# Patient Record
Sex: FEMALE | Race: WHITE | NOT HISPANIC OR LATINO | Employment: UNEMPLOYED | URBAN - METROPOLITAN AREA
[De-identification: names, ages, dates, MRNs, and addresses within clinical notes are randomized per-mention and may not be internally consistent; named-entity substitution may affect disease eponyms.]

---

## 2021-01-01 ENCOUNTER — TRANSFERRED RECORDS (OUTPATIENT)
Dept: HEALTH INFORMATION MANAGEMENT | Facility: CLINIC | Age: 56
End: 2021-01-01
Payer: COMMERCIAL

## 2021-01-01 ENCOUNTER — VIRTUAL VISIT (OUTPATIENT)
Dept: NEUROSURGERY | Facility: CLINIC | Age: 56
End: 2021-01-01
Attending: NURSE PRACTITIONER
Payer: COMMERCIAL

## 2021-01-01 ENCOUNTER — PRE VISIT (OUTPATIENT)
Dept: NEUROSURGERY | Facility: CLINIC | Age: 56
End: 2021-01-01

## 2021-01-01 ENCOUNTER — TRANSCRIBE ORDERS (OUTPATIENT)
Dept: OTHER | Age: 56
End: 2021-01-01

## 2021-01-01 ENCOUNTER — MEDICAL CORRESPONDENCE (OUTPATIENT)
Dept: HEALTH INFORMATION MANAGEMENT | Facility: CLINIC | Age: 56
End: 2021-01-01
Payer: COMMERCIAL

## 2021-01-01 ENCOUNTER — TRANSFERRED RECORDS (OUTPATIENT)
Dept: HEALTH INFORMATION MANAGEMENT | Facility: CLINIC | Age: 56
End: 2021-01-01

## 2021-01-01 DIAGNOSIS — C71.9 GLIOBLASTOMA (H): Primary | ICD-10-CM

## 2021-01-01 DIAGNOSIS — C71.2 GLIOBLASTOMA OF TEMPORAL LOBE (H): Primary | ICD-10-CM

## 2021-01-01 PROCEDURE — 999N001193 HC VIDEO/TELEPHONE VISIT; NO CHARGE

## 2021-01-01 PROCEDURE — 99203 OFFICE O/P NEW LOW 30 MIN: CPT | Mod: 95 | Performed by: NEUROLOGICAL SURGERY

## 2021-01-01 RX ORDER — DRONABINOL 2.5 MG/1
CAPSULE ORAL
COMMUNITY
Start: 2021-06-15

## 2021-01-01 RX ORDER — FAMOTIDINE 40 MG/1
20 TABLET, FILM COATED ORAL
COMMUNITY
Start: 2021-06-14

## 2021-01-01 RX ORDER — ACETAMINOPHEN 325 MG/1
325-650 TABLET ORAL
COMMUNITY
Start: 2021-03-05

## 2021-01-01 RX ORDER — FERROUS SULFATE 325(65) MG
325 TABLET ORAL
COMMUNITY

## 2021-01-01 RX ORDER — FOLIC ACID 1 MG/1
1 TABLET ORAL
COMMUNITY

## 2021-01-01 RX ORDER — BUPROPION HYDROCHLORIDE 100 MG/1
100 TABLET, EXTENDED RELEASE ORAL
COMMUNITY
Start: 2020-12-08

## 2021-02-11 ENCOUNTER — TRANSFERRED RECORDS (OUTPATIENT)
Dept: RADIATION ONCOLOGY | Facility: CLINIC | Age: 56
End: 2021-02-11

## 2021-10-27 NOTE — TELEPHONE ENCOUNTER
FUTURE VISIT INFORMATION      FUTURE VISIT INFORMATION:    Date: 10/29/2021    Time: 2pm    [place] [facility] [place]  REFERRAL INFORMATION:    Referring provider:      Referring providers clinic:  [facility]    Reason for visit/diagnosis  Glioblastoma    RECORDS REQUESTED FROM:       Clinic name Comments Records Status Imaging Status   [facility] Head-2/9/2021, 2/2/2021, 11/3/2020    [facility] Head/Neck-2/2/2021, 11/3/2020    MR Brain-2/8/2021, 2/3/2021 Care Everywhere Requested to PACS         [facility]  Requested  Requested                        10/27/2021-Request for images faxed to [facility] and request for records faxed to [facility]-MR @ 1130am    10/28/2021-[facility] Images now in PACS-MR @ 558am    2nd request for records faxed to [facility] @ 722am

## 2021-10-29 NOTE — PROGRESS NOTES
Holli is a 56 year old who is being evaluated via a billable telephone visit.      What phone number would you like to be contacted at? [phone]  Patient would also like to have  be called and join telephone visit at phone number [phone].  How would you like to obtain your AVS? Mail a copy  Phone call duration: 25 minutes    56 F s/p glioblastoma resection on 2/8/2021 and subsequently undergone TMZ/RT. The brain MRIs subsequent to the resection were not available to me, but the patient reports no evidence of recurrence. The patient would like to establish contact should future MRI reveal recurrence. She presents for this purpose.    I had reviewed the available records and discussed the therapeutic options should the tumor recur, including EMMA, gammatiles, clinical trials, and EAP. The patient will contact me again should future MRI showed tumor recurrence.

## 2021-10-29 NOTE — LETTER
10/29/2021         RE: Holli Pires  [address]  [place] [address]        Dear Colleague,    Thank you for referring your patient, Holli Pires, to the [facility]. Please see a copy of my visit note below.    Holli is a 56 year old who is being evaluated via a billable telephone visit.      What phone number would you like to be contacted at? [phone]  Patient would also like to have  be called and join telephone visit at phone number [phone].  How would you like to obtain your AVS? Mail a copy  Phone call duration: 25 minutes    56 F s/p glioblastoma resection on 2/8/2021 and subsequently undergone TMZ/RT. The brain MRIs subsequent to the resection were not available to me, but the patient reports no evidence of recurrence. The patient would like to establish contact should future MRI reveal recurrence. She presents for this purpose.    I had reviewed the available records and discussed the therapeutic options should the tumor recur, including EMMA, gammatiles, clinical trials, and EAP. The patient will contact me again should future MRI showed tumor recurrence.        Again, thank you for allowing me to participate in the care of your patient.      Sincerely,    Moshe Lee MD

## 2022-01-01 ENCOUNTER — ONCOLOGY VISIT (OUTPATIENT)
Dept: ONCOLOGY | Facility: CLINIC | Age: 57
End: 2022-01-01
Attending: PSYCHIATRY & NEUROLOGY
Payer: COMMERCIAL

## 2022-01-01 ENCOUNTER — PATIENT OUTREACH (OUTPATIENT)
Dept: ONCOLOGY | Facility: CLINIC | Age: 57
End: 2022-01-01

## 2022-01-01 ENCOUNTER — TRANSFERRED RECORDS (OUTPATIENT)
Dept: HEALTH INFORMATION MANAGEMENT | Facility: CLINIC | Age: 57
End: 2022-01-01

## 2022-01-01 ENCOUNTER — PRE VISIT (OUTPATIENT)
Dept: ONCOLOGY | Facility: CLINIC | Age: 57
End: 2022-01-01

## 2022-01-01 ENCOUNTER — LAB REQUISITION (OUTPATIENT)
Dept: LAB | Facility: CLINIC | Age: 57
End: 2022-01-01
Payer: COMMERCIAL

## 2022-01-01 ENCOUNTER — OFFICE VISIT (OUTPATIENT)
Dept: RADIATION ONCOLOGY | Facility: CLINIC | Age: 57
End: 2022-01-01
Attending: RADIOLOGY
Payer: COMMERCIAL

## 2022-01-01 ENCOUNTER — TRANSCRIBE ORDERS (OUTPATIENT)
Dept: OTHER | Age: 57
End: 2022-01-01

## 2022-01-01 ENCOUNTER — MEDICAL CORRESPONDENCE (OUTPATIENT)
Dept: HEALTH INFORMATION MANAGEMENT | Facility: CLINIC | Age: 57
End: 2022-01-01

## 2022-01-01 VITALS
WEIGHT: 181 LBS | BODY MASS INDEX: 32.07 KG/M2 | OXYGEN SATURATION: 98 % | DIASTOLIC BLOOD PRESSURE: 80 MMHG | HEART RATE: 54 BPM | SYSTOLIC BLOOD PRESSURE: 129 MMHG | TEMPERATURE: 97.7 F | HEIGHT: 63 IN

## 2022-01-01 VITALS
OXYGEN SATURATION: 100 % | HEART RATE: 62 BPM | SYSTOLIC BLOOD PRESSURE: 118 MMHG | BODY MASS INDEX: 31.54 KG/M2 | WEIGHT: 175.8 LBS | RESPIRATION RATE: 16 BRPM | DIASTOLIC BLOOD PRESSURE: 76 MMHG

## 2022-01-01 DIAGNOSIS — C71.9 GLIOBLASTOMA (H): Primary | ICD-10-CM

## 2022-01-01 DIAGNOSIS — C71.9 GLIOBLASTOMA (H): ICD-10-CM

## 2022-01-01 LAB
PATH REPORT.COMMENTS IMP SPEC: ABNORMAL
PATH REPORT.COMMENTS IMP SPEC: YES
PATH REPORT.FINAL DX SPEC: ABNORMAL
PATH REPORT.GROSS SPEC: ABNORMAL
PATH REPORT.RELEVANT HX SPEC: ABNORMAL
PATH REPORT.RELEVANT HX SPEC: ABNORMAL
PATH REPORT.SITE OF ORIGIN SPEC: ABNORMAL
PHQ9 SCORE: 15

## 2022-01-01 PROCEDURE — 88321 CONSLTJ&REPRT SLD PREP ELSWR: CPT | Performed by: SPECIALIST

## 2022-01-01 PROCEDURE — G0463 HOSPITAL OUTPT CLINIC VISIT: HCPCS

## 2022-01-01 PROCEDURE — 99205 OFFICE O/P NEW HI 60 MIN: CPT | Performed by: PSYCHIATRY & NEUROLOGY

## 2022-01-01 RX ORDER — NITROGLYCERIN 0.4 MG/1
0.4 TABLET SUBLINGUAL
COMMUNITY
Start: 2020-11-05

## 2022-01-01 RX ORDER — METOPROLOL SUCCINATE 25 MG/1
0.5 TABLET, EXTENDED RELEASE ORAL DAILY
COMMUNITY
Start: 2022-01-01

## 2022-01-01 RX ORDER — MULTIVITAMIN,THER AND MINERALS
1 TABLET ORAL DAILY
COMMUNITY

## 2022-01-01 RX ORDER — ALBUTEROL SULFATE 90 UG/1
1-2 AEROSOL, METERED RESPIRATORY (INHALATION)
Status: CANCELLED
Start: 2022-01-01

## 2022-01-01 RX ORDER — FAMOTIDINE 40 MG/1
20 TABLET, FILM COATED ORAL
COMMUNITY
Start: 2021-01-01

## 2022-01-01 RX ORDER — METHYLPREDNISOLONE SODIUM SUCCINATE 125 MG/2ML
125 INJECTION, POWDER, LYOPHILIZED, FOR SOLUTION INTRAMUSCULAR; INTRAVENOUS
Status: CANCELLED
Start: 2022-01-01

## 2022-01-01 RX ORDER — ONDANSETRON 4 MG/1
4 TABLET, ORALLY DISINTEGRATING ORAL
COMMUNITY
Start: 2022-01-01

## 2022-01-01 RX ORDER — LISINOPRIL 10 MG/1
TABLET ORAL
COMMUNITY
Start: 2021-01-01

## 2022-01-01 RX ORDER — EPINEPHRINE 1 MG/ML
0.3 INJECTION, SOLUTION INTRAMUSCULAR; SUBCUTANEOUS EVERY 5 MIN PRN
Status: CANCELLED | OUTPATIENT
Start: 2022-01-01

## 2022-01-01 RX ORDER — LORAZEPAM 2 MG/ML
0.5 INJECTION INTRAMUSCULAR EVERY 4 HOURS PRN
Status: CANCELLED | OUTPATIENT
Start: 2022-01-01

## 2022-01-01 RX ORDER — ROPINIROLE 0.5 MG/1
TABLET, FILM COATED ORAL
COMMUNITY
Start: 2022-01-01

## 2022-01-01 RX ORDER — LEVETIRACETAM 1000 MG/1
1 TABLET ORAL 2 TIMES DAILY
COMMUNITY
Start: 2022-01-01

## 2022-01-01 RX ORDER — HEPARIN SODIUM (PORCINE) LOCK FLUSH IV SOLN 100 UNIT/ML 100 UNIT/ML
5 SOLUTION INTRAVENOUS
Status: CANCELLED | OUTPATIENT
Start: 2022-01-01

## 2022-01-01 RX ORDER — HEPARIN SODIUM,PORCINE 10 UNIT/ML
5 VIAL (ML) INTRAVENOUS
Status: CANCELLED | OUTPATIENT
Start: 2022-01-01

## 2022-01-01 RX ORDER — MEPERIDINE HYDROCHLORIDE 25 MG/ML
25 INJECTION INTRAMUSCULAR; INTRAVENOUS; SUBCUTANEOUS EVERY 30 MIN PRN
Status: CANCELLED | OUTPATIENT
Start: 2022-01-01

## 2022-01-01 RX ORDER — DIPHENHYDRAMINE HYDROCHLORIDE 50 MG/ML
50 INJECTION INTRAMUSCULAR; INTRAVENOUS
Status: CANCELLED
Start: 2022-01-01

## 2022-01-01 RX ORDER — AMOXICILLIN 250 MG
1 CAPSULE ORAL DAILY
COMMUNITY
Start: 2021-08-02

## 2022-01-01 RX ORDER — OXYCODONE AND ACETAMINOPHEN 5; 325 MG/1; MG/1
1-2 TABLET ORAL
COMMUNITY
Start: 2021-02-11

## 2022-01-01 RX ORDER — ALBUTEROL SULFATE 0.83 MG/ML
2.5 SOLUTION RESPIRATORY (INHALATION)
Status: CANCELLED | OUTPATIENT
Start: 2022-01-01

## 2022-01-01 ASSESSMENT — ENCOUNTER SYMPTOMS
NAUSEA: 1
MYALGIAS: 1
CONSTITUTIONAL NEGATIVE: 1
HEADACHES: 1
CARDIOVASCULAR NEGATIVE: 1
RESPIRATORY NEGATIVE: 1
EYES NEGATIVE: 1
SPEECH CHANGE: 1

## 2022-01-01 ASSESSMENT — PAIN SCALES - GENERAL
PAINLEVEL: EXTREME PAIN (8)
PAINLEVEL: SEVERE PAIN (7)

## 2022-07-27 NOTE — PROGRESS NOTES
I received a referral for patient to be seen by medical oncology.  I called the patient and left a vm asking that they return my call.  Contact information was left.    New Patient Oncology Nurse Navigator Note     Referring provider: Referral for  Glioblastoma. Referred by Dr. Diana Felix MD at [facility] [place] [address] [address] [facility] [address]. Ph. [phone] Fax. [phone] Referral from fax.        Referred to (specialty): Neuro Medical Oncology    Requested provider (if applicable): Dr. Rehana Quinn     Date Referral Received: 7/27/2022     Evaluation for : brain tumor--GBM     Clinical History (per Nurse review of records provided):    **BOOK MARKED**  NOTES:  7/25/2022:  Last note from [facility]--media tab  2/8/2021:  Surgical note:  Craniotomy @ [facility]    IMAGING:  ~multiple images from [facility]~    PATHOLOGY:  2/8/2021  Amendment  2/17/2021 - The tissue was submitted to [facility] for MGMT promoter methylation testing. Their report indicates that gene methylation was not detected.  Please see attached scanned report.  [facility]   Final Diagnosis  A) LEFT FRONTAL DURA, EXCISION:   1. Dura mater with reactive changes   2. No invasion by neoplasm     B,C) BRAIN, LEFT FRONTOTEMPORAL LOBE TUMOR, RESECTION:   1. Glioblastoma, WHO grade 4; see comment   2. IDH1 R132H immunostain negative   3. MGMT promoter methylation ordered, to be reported by addendum [facility]   Amendment electronically signed by Savage Lennon MD on 2/17/2021 at  9:41 AM Electronically signed by Savage Lennon MD on 2/9/2021 at  3:21 PM    Comment  O(6)-methylguanine-DNA methyltransferase (MGMT) promoter methylation testing is recommended for glioblastoma to help predict response to alkylating agent therapy (1-2). It is forwarded by [facility] as a reflex test and is performed by [facility]. No additional order needs to be placed at this time.     Material from block C5 is allocated for ancillary testing purposes. Results of this testing will be published by addendum.             Clinical Assessment / Barriers to Care (Per Nurse): none noted       Records Location (Care Everywhere, Media, etc.): [facility]     Records Needed: [facility], imaging from?     Additional testing needed prior to consult: none

## 2022-07-27 NOTE — PROGRESS NOTES
"I left a second vm on Holli's phone and looked at the [facility] records and left a generic message on this phone listed as \"other\":  [phone]    Reocrds have been collected and waiting to reach Holli to hopefully schedule [place] [facility] [place]  "

## 2022-07-27 NOTE — TELEPHONE ENCOUNTER
Action 2022 2:41 PM ABT   Action Taken Slides 21: [id number] from [facility] received and taken to 5th floor path lab for review.     RECORDS STATUS - ALL OTHER DIAGNOSIS      Action    Action Taken 22  Imaging reports from [facility] received, sent to HIM for upload    Radiation records from [facility] received, sent to HIM for upload.    Imaging from [facility] resolved to PACS  2:10 PM    Imaging from [facility] resolved to PACS  3:09 PM       RECORDS RECEIVED FROM: Bryant Muir, [facility]   DATE RECEIVED: 22   NOTES STATUS DETAILS   OFFICE NOTE from referring provider [facility] - Received  Dr. Diana Felix   OFFICE NOTE from medical oncologist [facility] 22   DISCHARGE SUMMARY from hospital CE - [facility] 20, 21, 21, 20   DISCHARGE REPORT from the ER CE - [facility] 22, 22   OPERATIVE REPORT CE - [facility] 21: Craniotomy   MEDICATION LIST CE [facility]   LABS     PATHOLOGY REPORTS [facility], Report in CE, slides requested   [employer] [facility] 21: [id number]   ANYTHING RELATED TO DIAGNOSIS Epic/CE 22   GENONOMIC TESTING     TYPE: [facility] - CE 21: MGMT   IMAGING (NEED IMAGES & REPORT)     CT SCANS PACS 22, 22, 11/3/20: Requested/[facility]    21, 21: PACS/[facility]   MRI PACS 21, 2/3/21: PACS    [facility]

## 2022-07-29 PROBLEM — C71.9 GLIOBLASTOMA (H): Status: ACTIVE | Noted: 2022-01-01

## 2022-07-29 NOTE — LETTER
7/29/2022         RE: Holli Pires  [address] [place] [address]        Dear Colleague,    Thank you for referring your patient, Holli Pires, to the [facility]. Please see a copy of my visit note below.    NEURO-ONCOLOGY INITIAL VISIT  Jul 29, 2022    CHIEF COMPLAINT: Ms. 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 She then completed standard chemoradiotherapy with concurrent temozolomide followed by 6 cycles of adjuvant temozolomide, completed in 10/2021. Imaging in 1/2022 showed changes that raised concern for recurrence and she was restart on adjuvant-dosed temozolomide. Imaging in 3/2022 prompted the stop of temozolomide and the start of bevacizumab (sharri) with imaging in 5/2022 demonstrating an anticipated positive treatment response to sharri. However, imaging in 7/2022 demonstrated further cancer progression. She remains on sharri monotherapy.     She is presenting to this initial clinic visit for evaluation and recommendations on treatment. She is currently under the care of Dr. Diana Felix.     Accompanying her to this visit is Benoit ().    HISTORY OF PRESENT ILLNESS  A summary of the patient s oncologic history is as follows;   -PRESENTATION: New onset seizure; generalized event.    -2/2021 MR brain imaging with a left frontal-temporal lesion.   -2/8/2021 SURGERY: Resection, gross total.   PATHOLOGY: Glioblastoma; IDH1 R132H wildtype, MGMT promoter unmethylated.  -3/8 - 4/26/2021 CHEMORADS: Radiation therapy with concurrent temozolomide.   -May - October 2021 CHEMO: Adjuvant temozolomide, completed 6 cycles.    -1/2022 MR brain imaging with evidence of cancer progression.   -Jan - Mar 2022 CHEMO: Restarted adjuvant-dosed temozolomide.    -3/2022 MR brain imaging demonstrated evidence of futher progression.    -4/7/2022 CHEMO: Started bevacizumab (sharri).  -5/31/2022 MR brain imaging with anticipated treatment response to sharri.    -7/12/2022 MR brain imaging with cancer progression.    -7/29/2022 NEURO-ONC: Consideration for repeat radiation with pembrolizumab.     Today in clinic;   -Holli is doing alright today.   -Generalized intermittent numbness. Due to RA, has much joint pain and associated generalized weakness.   -Blurring of vision in right > left eye, intermittent in nature.  -Experiences occasional headaches, 1-2 x per week at 3/10 in severity. Uses oxycodone as needed.   -Chronic fatigue. Did not tolerate Ritalin.   -She notes slowed cognition and word-finding difficulty.   -Seizure on presentation was a generalized event. No recurrent events. On Keppra.   -Mood is lower, has chronic depression.   -Off all steroids.  -Low appetite; not taking Marinol.       MEDICATIONS   Current Outpatient Medications   Medication Sig Dispense Refill     famotidine (PEPCID) 40 MG tablet Take 20 mg by mouth       levETIRAcetam (KEPPRA) 1000 MG tablet Take 1 tablet by mouth 2 times daily       lisinopril (ZESTRIL) 10 MG tablet TAKE 1 TABLET(10 MG) BY MOUTH EVERY DAY       metoprolol succinate ER (TOPROL XL) 25 MG 24 hr tablet Take 0.5 tablets by mouth daily       nitroGLYcerin (NITROSTAT) 0.4 MG sublingual tablet Place 0.4 mg under the tongue       ondansetron (ZOFRAN ODT) 4 MG ODT tab Place 4 mg under the tongue       oxyCODONE-acetaminophen (PERCOCET) 5-325 MG tablet Take 1-2 tablets by mouth       rOPINIRole (REQUIP) 0.5 MG tablet        senna-docusate (SENOKOT-S/PERICOLACE) 8.6-50 MG tablet Take 1 tablet by mouth daily       acetaminophen (TYLENOL) 325 MG tablet Take 325-650 mg by mouth       buPROPion (WELLBUTRIN SR) 100 MG 12 hr tablet Take 100 mg by mouth       cholecalciferol 50 MCG (2000 UT) tablet Take 2,000 Units by mouth       dronabinol (MARINOL) 2.5 MG capsule        famotidine (PEPCID) 40 MG tablet Take 20 mg by  "mouth       ferrous sulfate (FEROSUL) 325 (65 Fe) MG tablet Take 325 mg by mouth       FLUoxetine (PROZAC) 20 MG capsule        folic acid (FOLVITE) 1 MG tablet Take 1 mg by mouth       Multiple Vitamins-Minerals (SUPER THERA GABINO M) TABS Take 1 tablet by mouth daily       DRUG ALLERGIES   Allergies   Allergen Reactions     Erythromycin Nausea and Vomiting and Rash     Sulfa Drugs Rash       IMMUNIZATIONS   Immunization History   Administered Date(s) Administered     COVID-19,PF,Pfizer 12+ Yrs (2022 and After) 01/20/2022       PHYSICAL EXAMINATION  /80 (BP Location: Right arm, Patient Position: Sitting, Cuff Size: Adult Regular)   Pulse 54   Temp 97.7  F (36.5  C) (Oral)   Ht 1.59 m (5' 2.6\")   Wt 82.1 kg (181 lb)   SpO2 98%   BMI 32.48 kg/m    Wt Readings from Last 2 Encounters:   07/29/22 82.1 kg (181 lb)      Ht Readings from Last 2 Encounters:   07/29/22 1.59 m (5' 2.6\")     KPS: 80    -Generally well appearing.  -Respiratory: Normal breath sounds, no audible wheezing.   -Psychiatric: Normal mood and affect. Pleasant, talkative.  -Neurologic:   MENTAL STATUS:     Alert, oriented to date.    Recall: Intact.    Speech fluent.    Comprehension intact to multi-step commands.   Good right-left orientation.     CRANIAL NERVES:     Pupils are equal, round.     Extraocular movements full, denies diplopia.     Visual fields full.     Facial sensation intact to light touch.   Symmetric facial movements.   Hearing intact.   No dysarthria.   MOTOR: No pronation or drift.  SENSATION: Intact to light touch throughout.  COORDINATION: Intact finger-nose with eyes open and closed.    GAIT:  Walks without assistance.   Slow speed. Antalgic.       MEDICAL RECORDS  Obtained and personally reviewed all available outside medical records in addition to reviewing any records available in our electronic system.   -Outside hospitalization and oncology clinic notes.     LABS  Personally reviewed all available lab results; CBC, " CMP. Pathology.     IMAGING  Personally reviewed prior MR brain imaging; Imaging in 5/2022 demonstrating an anticipated positive treatment response to sahrri. However, imaging in 7/2022 demonstrated further cancer progression.    Imaging was shown to and results were reviewed with Holli and Benoit.       IMPRESSION  Clinic time for this high complexity encounter was spent discussing in detail the nature of her cancer, answering questions pertaining to my recommendations, and devising the treatment plan as outlined below. Both Holli and Benoit are acutely aware that she has a type of brain cancer for which there is currently no cure. Therefore, cancer-directed treatment strives to slow further growth and increase the time interval to recurrence.    Prior to Holli's appointment today, I reviewed her chart (including the clinic notes from Dr. Felix), pathology results, and imaging. Today, I confirmed her understand of the clinical and prognostic significance of her cancer's MGMT promoter methylation status. We also reviewed her imaging that detailed since January there has been changes concerning for cancer recurrence. Chemotherapy changes have been instituted to address this radiographic progression of her cancer, including a repeat trial of temozolomide and most recently, the use of sharri. In addition, I have also discussed her case with Dr. Chao in neurosurgery and Beatriz Wilkes and Hiral in radiation oncology. In the setting of radiographic evidence of cancer recurrence, a multifaceted treatment approach needs to be considered. In discussion with Dr. Chao, resection of the left frontal contrast enhancement would be associated with much risk. In discussion with Beatriz Wilkes and Hiral, they will request her prior treatment plans to determine the feasibility of repeat radiation. I will look to arrange for Holli to see Dr. Blackman/ Chung in consultation.     Pending their discussion, a treatment plan will be devised and then, the  most appropriate chemotherapy can be added to the regimen. One option we discussed today was the off-labeled use of immunotherapy with a check-point inhibitor, like pembrolizumab. While in clinical trials, check-point inhibitors have shown little effectiveness against glioblastoma, more heavily treated cancer, hence those that have accumulated more mutations, could have a more benefit. We discussed in detail the side effects of immunotherapy; inflammation of any organ of the body. We will be monitor labs to assess liver and thyroid function and also clinically, for any new clinical side effects (diarrhea, SOB, etc). I stressed my uncertainty if immunotherapy would worsen her RA. If side effect(s) develop, they can be largely reversed by the use of high-dosed steroids. Specific to the brain; given her extent of intracranial cancer, she could experience brain swelling for which, she could become very symptomatic with worsening confusion, headache, vomiting, weakness, seizure, etc. Once I know radiation therapy is an option, I will move forward with securing pembrolizumab.       With regard to the continued use of bevacizumab (sharri); in the setting of contrast enhancing progression seen on imaging and given Holli's overall clinical stability without the need for steroids, it is my recommendation to stop this chemotherapy as there appears to be limited to no benefit. As such, Holli is largely taking on the risk/ side effect profile of sharri (MI, stroke, bleeding, clotting, etc).     PROBLEM LIST  Glioblastoma  Seizure    PLAN  -CANCER-DIRECTED THERAPY-  -As above; referral to radiation oncology.     -STEROIDS-  -Currently off dexamethasone.    -SEIZURE MANAGEMENT-  -Given history of seizures, will continue current antiepileptic regimen of Keppra for the foreseeable future.  -On Wellbutrin that can lower one's seizure threshold.     -Quality of life/ MOOD/ FATIGUE-  -History of depression; on fluoxetine and Wellbutrin.    -Chronic fatigue. No prior benefit from Ritalin.  -Enrollment for medical marijuana; [email or web address]      Return to clinic pending discussion with radiation oncology.     In the meantime, Holli knows to call with questions or concerns or to report new complaints and can be seen sooner if needed.      Rehana Quinn MD  Neuro-oncology

## 2022-07-29 NOTE — NURSING NOTE
"Oncology Rooming Note    July 29, 2022 4:12 PM   Holli Pires is a 57 year old female who presents for:    Chief Complaint   Patient presents with     Oncology Clinic Visit     Glioblastoma      Initial Vitals: /80 (BP Location: Right arm, Patient Position: Sitting, Cuff Size: Adult Regular)   Pulse 54   Temp 97.7  F (36.5  C) (Oral)   Ht 1.59 m (5' 2.6\")   Wt 82.1 kg (181 lb)   SpO2 98%   BMI 32.48 kg/m   Estimated body mass index is 32.48 kg/m  as calculated from the following:    Height as of this encounter: 1.59 m (5' 2.6\").    Weight as of this encounter: 82.1 kg (181 lb). Body surface area is 1.9 meters squared.  Severe Pain (7) Comment: Data Unavailable   No LMP recorded. Patient is postmenopausal.  Allergies reviewed: Yes  Medications reviewed: Yes    Medications: Medication refills not needed today.  Pharmacy name entered into [facility]: [id number] [place] [phone] AT [facility]  [address]    Clinical concerns: none.      Silvestre Fofana"
PROCEDURES:  Craniotomy, using frameless stereotaxy, for neoplasm excision 21-Sep-2020 21:27:32  Jacinto Rhodes

## 2022-07-29 NOTE — PROGRESS NOTES
NEURO-ONCOLOGY INITIAL VISIT  Jul 29, 2022    CHIEF COMPLAINT: Ms. 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 She then completed standard chemoradiotherapy with concurrent temozolomide followed by 6 cycles of adjuvant temozolomide, completed in 10/2021. Imaging in 1/2022 showed changes that raised concern for recurrence and she was restart on adjuvant-dosed temozolomide. Imaging in 3/2022 prompted the stop of temozolomide and the start of bevacizumab (sharri) with imaging in 5/2022 demonstrating an anticipated positive treatment response to sharri. However, imaging in 7/2022 demonstrated further cancer progression. She remains on sharri monotherapy.     She is presenting to this initial clinic visit for evaluation and recommendations on treatment. She is currently under the care of Dr. Diana Felix.     Accompanying her to this visit is Benoit ().    HISTORY OF PRESENT ILLNESS  A summary of the patient s oncologic history is as follows;   -PRESENTATION: New onset seizure; generalized event.    -2/2021 MR brain imaging with a left frontal-temporal lesion.   -2/8/2021 SURGERY: Resection, gross total.   PATHOLOGY: Glioblastoma; IDH1 R132H wildtype, MGMT promoter unmethylated.  -3/8 - 4/26/2021 CHEMORADS: Radiation therapy with concurrent temozolomide.   -May - October 2021 CHEMO: Adjuvant temozolomide, completed 6 cycles.    -1/2022 MR brain imaging with evidence of cancer progression.   -Jan - Mar 2022 CHEMO: Restarted adjuvant-dosed temozolomide.    -3/2022 MR brain imaging demonstrated evidence of futher progression.   -4/7/2022 CHEMO: Started bevacizumab (sharri).  -5/31/2022 MR brain imaging with anticipated treatment response to sharri.    -7/12/2022 MR brain imaging with cancer progression.    -7/29/2022 NEURO-ONC: Consideration for repeat radiation with pembrolizumab.     Today in  clinic;   -Holli is doing alright today.   -Generalized intermittent numbness. Due to RA, has much joint pain and associated generalized weakness.   -Blurring of vision in right > left eye, intermittent in nature.  -Experiences occasional headaches, 1-2 x per week at 3/10 in severity. Uses oxycodone as needed.   -Chronic fatigue. Did not tolerate Ritalin.   -She notes slowed cognition and word-finding difficulty.   -Seizure on presentation was a generalized event. No recurrent events. On Keppra.   -Mood is lower, has chronic depression.   -Off all steroids.  -Low appetite; not taking Marinol.       MEDICATIONS   Current Outpatient Medications   Medication Sig Dispense Refill     famotidine (PEPCID) 40 MG tablet Take 20 mg by mouth       levETIRAcetam (KEPPRA) 1000 MG tablet Take 1 tablet by mouth 2 times daily       lisinopril (ZESTRIL) 10 MG tablet TAKE 1 TABLET(10 MG) BY MOUTH EVERY DAY       metoprolol succinate ER (TOPROL XL) 25 MG 24 hr tablet Take 0.5 tablets by mouth daily       nitroGLYcerin (NITROSTAT) 0.4 MG sublingual tablet Place 0.4 mg under the tongue       ondansetron (ZOFRAN ODT) 4 MG ODT tab Place 4 mg under the tongue       oxyCODONE-acetaminophen (PERCOCET) 5-325 MG tablet Take 1-2 tablets by mouth       rOPINIRole (REQUIP) 0.5 MG tablet        senna-docusate (SENOKOT-S/PERICOLACE) 8.6-50 MG tablet Take 1 tablet by mouth daily       acetaminophen (TYLENOL) 325 MG tablet Take 325-650 mg by mouth       buPROPion (WELLBUTRIN SR) 100 MG 12 hr tablet Take 100 mg by mouth       cholecalciferol 50 MCG (2000 UT) tablet Take 2,000 Units by mouth       dronabinol (MARINOL) 2.5 MG capsule        famotidine (PEPCID) 40 MG tablet Take 20 mg by mouth       ferrous sulfate (FEROSUL) 325 (65 Fe) MG tablet Take 325 mg by mouth       FLUoxetine (PROZAC) 20 MG capsule        folic acid (FOLVITE) 1 MG tablet Take 1 mg by mouth       Multiple Vitamins-Minerals (SUPER THERA GABINO M) TABS Take 1 tablet by mouth daily    "    DRUG ALLERGIES   Allergies   Allergen Reactions     Erythromycin Nausea and Vomiting and Rash     Sulfa Drugs Rash       IMMUNIZATIONS   Immunization History   Administered Date(s) Administered     COVID-19,PF,Pfizer 12+ Yrs (2022 and After) 01/20/2022       PHYSICAL EXAMINATION  /80 (BP Location: Right arm, Patient Position: Sitting, Cuff Size: Adult Regular)   Pulse 54   Temp 97.7  F (36.5  C) (Oral)   Ht 1.59 m (5' 2.6\")   Wt 82.1 kg (181 lb)   SpO2 98%   BMI 32.48 kg/m    Wt Readings from Last 2 Encounters:   07/29/22 82.1 kg (181 lb)      Ht Readings from Last 2 Encounters:   07/29/22 1.59 m (5' 2.6\")     KPS: 80    -Generally well appearing.  -Respiratory: Normal breath sounds, no audible wheezing.   -Psychiatric: Normal mood and affect. Pleasant, talkative.  -Neurologic:   MENTAL STATUS:     Alert, oriented to date.    Recall: Intact.    Speech fluent.    Comprehension intact to multi-step commands.   Good right-left orientation.     CRANIAL NERVES:     Pupils are equal, round.     Extraocular movements full, denies diplopia.     Visual fields full.     Facial sensation intact to light touch.   Symmetric facial movements.   Hearing intact.   No dysarthria.   MOTOR: No pronation or drift.  SENSATION: Intact to light touch throughout.  COORDINATION: Intact finger-nose with eyes open and closed.    GAIT:  Walks without assistance.   Slow speed. Antalgic.       MEDICAL RECORDS  Obtained and personally reviewed all available outside medical records in addition to reviewing any records available in our electronic system.   -Outside hospitalization and oncology clinic notes.     LABS  Personally reviewed all available lab results; CBC, CMP. Pathology.     IMAGING  Personally reviewed prior MR brain imaging; Imaging in 5/2022 demonstrating an anticipated positive treatment response to sharri. However, imaging in 7/2022 demonstrated further cancer progression.    Imaging was shown to and results were " reviewed with Holli and Benoit.       IMPRESSION  Clinic time for this high complexity encounter was spent discussing in detail the nature of her cancer, answering questions pertaining to my recommendations, and devising the treatment plan as outlined below. Both Holli and Benoit are acutely aware that she has a type of brain cancer for which there is currently no cure. Therefore, cancer-directed treatment strives to slow further growth and increase the time interval to recurrence.    Prior to Holli's appointment today, I reviewed her chart (including the clinic notes from Dr. Felix), pathology results, and imaging. Today, I confirmed her understand of the clinical and prognostic significance of her cancer's MGMT promoter methylation status. We also reviewed her imaging that detailed since January there has been changes concerning for cancer recurrence. Chemotherapy changes have been instituted to address this radiographic progression of her cancer, including a repeat trial of temozolomide and most recently, the use of sharri. In addition, I have also discussed her case with Dr. Chao in neurosurgery and Beatriz Wilkes and Hiral in radiation oncology. In the setting of radiographic evidence of cancer recurrence, a multifaceted treatment approach needs to be considered. In discussion with Dr. Chao, resection of the left frontal contrast enhancement would be associated with much risk. In discussion with Beatriz Wilkes and Hiral, they will request her prior treatment plans to determine the feasibility of repeat radiation. I will look to arrange for Holli to see Dr. Blackman/ Chung in consultation.     Pending their discussion, a treatment plan will be devised and then, the most appropriate chemotherapy can be added to the regimen. One option we discussed today was the off-labeled use of immunotherapy with a check-point inhibitor, like pembrolizumab. While in clinical trials, check-point inhibitors have shown little effectiveness  against glioblastoma, more heavily treated cancer, hence those that have accumulated more mutations, could have a more benefit. We discussed in detail the side effects of immunotherapy; inflammation of any organ of the body. We will be monitor labs to assess liver and thyroid function and also clinically, for any new clinical side effects (diarrhea, SOB, etc). I stressed my uncertainty if immunotherapy would worsen her RA. If side effect(s) develop, they can be largely reversed by the use of high-dosed steroids. Specific to the brain; given her extent of intracranial cancer, she could experience brain swelling for which, she could become very symptomatic with worsening confusion, headache, vomiting, weakness, seizure, etc. Once I know radiation therapy is an option, I will move forward with securing pembrolizumab.       With regard to the continued use of bevacizumab (sharri); in the setting of contrast enhancing progression seen on imaging and given Holli's overall clinical stability without the need for steroids, it is my recommendation to stop this chemotherapy as there appears to be limited to no benefit. As such, Holli is largely taking on the risk/ side effect profile of sharri (MI, stroke, bleeding, clotting, etc).     PROBLEM LIST  Glioblastoma  Seizure    PLAN  -CANCER-DIRECTED THERAPY-  -As above; referral to radiation oncology.     -STEROIDS-  -Currently off dexamethasone.    -SEIZURE MANAGEMENT-  -Given history of seizures, will continue current antiepileptic regimen of Keppra for the foreseeable future.  -On Wellbutrin that can lower one's seizure threshold.     -Quality of life/ MOOD/ FATIGUE-  -History of depression; on fluoxetine and Wellbutrin.   -Chronic fatigue. No prior benefit from Ritalin.  -Enrollment for medical marijuana; [email or web address]      Return to clinic pending discussion with radiation oncology.     In the meantime, Holli knows to call with questions or concerns or to report new  complaints and can be seen sooner if needed.    Rehana Quinn MD  Neuro-oncology

## 2022-08-12 NOTE — PROGRESS NOTES
INITIAL PATIENT ASSESSMENT    Diagnosis: glioblastoma    Prior radiation therapy: Brain March 2021.  See medical record for more detailed information.      Prior chemotherapy: Following resection on 2/8/2021 she then completed in 10/2021 standard chemoradiotherapy with concurrent temozolomide followed by 6 cycles of adjuvant temozolomide. Imaging in 1/2022 showed changes that raised concern for recurrence and she was restarted on adjuvant-dosed temozolomide. Imaging in 3/2022 prompted the stop of temozolomide and the start of bevacizumab (sharri) with imaging in 5/2022 demonstrating an anticipated positive treatment response to sharri. However, imaging in 7/2022 demonstrated further cancer progression and last chemo dose was 7/21/22.     Prior hormonal therapy:No    Pain Eval:  Current history of pain associated with this visit:   Intensity: 7/10  Current: dull and aching  Location: generalized from Rheumatoid arthritis.   Treatment: tylenol    Psychosocial  Living arrangements:   Fall Risk: independent   referral needs: Not needed    Advanced Directive: Yes  Implantable Cardiac Device? No    Onset of menarche: 9 years old  LMP: No LMP recorded. Patient is postmenopausal.  Onset of menopause: 2011  Abnormal vaginal bleeding/discharge: No  Are you pregnant? No      Nurse face-to-face time: Level 5:  over 15 min face to face time          Review of Systems   Constitutional: Negative.    HENT: Negative.    Eyes: Negative.    Respiratory: Negative.    Cardiovascular: Negative.    Gastrointestinal: Positive for nausea.   Genitourinary: Negative.    Musculoskeletal: Positive for myalgias.   Skin: Negative.    Neurological: Positive for speech change and headaches.        Migraines every two weeks which is alleviated by percocet and a nap.  Expressive aphasia.     Endo/Heme/Allergies: Negative.

## 2022-08-12 NOTE — LETTER
8/12/2022         RE: Holli Pires  [address] [place] [address]        INITIAL PATIENT ASSESSMENT    Diagnosis: glioblastoma    Prior radiation therapy: Brain March 2021.  See medical record for more detailed information.      Prior chemotherapy: Following resection on 2/8/2021 she then completed in 10/2021 standard chemoradiotherapy with concurrent temozolomide followed by 6 cycles of adjuvant temozolomide. Imaging in 1/2022 showed changes that raised concern for recurrence and she was restarted on adjuvant-dosed temozolomide. Imaging in 3/2022 prompted the stop of temozolomide and the start of bevacizumab (sharri) with imaging in 5/2022 demonstrating an anticipated positive treatment response to sharri. However, imaging in 7/2022 demonstrated further cancer progression and last chemo dose was 7/21/22.     Prior hormonal therapy:No    Pain Eval:  Current history of pain associated with this visit:   Intensity: 7/10  Current: dull and aching  Location: generalized from Rheumatoid arthritis.   Treatment: tylenol    Psychosocial  Living arrangements:   Fall Risk: independent   referral needs: Not needed    Advanced Directive: Yes  Implantable Cardiac Device? No    Onset of menarche: 9 years old  LMP: No LMP recorded. Patient is postmenopausal.  Onset of menopause: 2011  Abnormal vaginal bleeding/discharge: No  Are you pregnant? No      Nurse face-to-face time: Level 5:  over 15 min face to face time          Review of Systems   Constitutional: Negative.    HENT: Negative.    Eyes: Negative.    Respiratory: Negative.    Cardiovascular: Negative.    Gastrointestinal: Positive for nausea.   Genitourinary: Negative.    Musculoskeletal: Positive for myalgias.   Skin: Negative.    Neurological: Positive for speech change and headaches.        Migraines every two weeks which is alleviated by percocet and a nap.  Expressive aphasia.     Endo/Heme/Allergies: Negative.                      Department of Radiation Oncology  [address] [address]  [phone]       Consultation Note    Name: Holli Pires MRN: [id number]   : 1965   Date of Service: 2022  Referring: Dr. Rehana Quinn / neuro-oncology     Reason for consultation: Recurrent left frontal glioblastoma    History of Present Illness   2021: Admitted with word finding difficulties. MRI demonstrates a large left frontotemporal tumor.    2021: Left frontal craniotomy and tumor resection. Pathology reveals a WHO grade 4 glioblastoma, IDH wild-type.    3/8/2021-2021: Receives adjuvant chemoradiotherapy (60 Gy/30 fractions) with concurrent temozolomide at [facility] in [facility]    2021 - 10/2021: Receives 6 cycles of adjuvant temozolomide    2022: MRI demonstrates radiographic evidence concerning for disease progression    2022 - 3/2022: Restarted on temozolomide    3/2022: MRI demonstrates evidence of disease progression    2022: Started on Bevacizumab    2022: MRI with radiographic evidence of a tumor response to therapy    2022: MRI demonstrates disease progression. She is not felt to be a candidate for additional surgical resection.    2022: Evaluated by Dr. Rehana Quinn in neuro-oncology at the [facility]. Discussed discontinuation of Bevacizumab and initiation of Pembrolizumab. Referral placed to radiation oncology for consideration of partial brain reirradiation.    Past Medical History:    Rheumatoid arthritis    Glioblastoma    Past Surgical History:    Right ACL repair    Cholecystectomy    Splenectomy    Bilateral knee replacement    Bilateral hip replacement    Bilateral wrist surgery    Bladder suspension with sling    Left craniotomy and tumor resection    Chemotherapy History:  See HPI    Radiation History:  See HPI    Pregnant: No  Implanted Cardiac Devices: Yes    Medications:  Current  Outpatient Medications   Medication Sig Dispense Refill     acetaminophen (TYLENOL) 325 MG tablet Take 325-650 mg by mouth       buPROPion (WELLBUTRIN SR) 100 MG 12 hr tablet Take 100 mg by mouth       cholecalciferol 50 MCG (2000 UT) tablet Take 2,000 Units by mouth       famotidine (PEPCID) 40 MG tablet Take 20 mg by mouth       famotidine (PEPCID) 40 MG tablet Take 20 mg by mouth       ferrous sulfate (FEROSUL) 325 (65 Fe) MG tablet Take 325 mg by mouth       FLUoxetine (PROZAC) 20 MG capsule        folic acid (FOLVITE) 1 MG tablet Take 1 mg by mouth       levETIRAcetam (KEPPRA) 1000 MG tablet Take 1 tablet by mouth 2 times daily       lisinopril (ZESTRIL) 10 MG tablet TAKE 1 TABLET(10 MG) BY MOUTH EVERY DAY       metoprolol succinate ER (TOPROL XL) 25 MG 24 hr tablet Take 0.5 tablets by mouth daily       Multiple Vitamins-Minerals (SUPER THERA GABINO M) TABS Take 1 tablet by mouth daily       nitroGLYcerin (NITROSTAT) 0.4 MG sublingual tablet Place 0.4 mg under the tongue       ondansetron (ZOFRAN ODT) 4 MG ODT tab Place 4 mg under the tongue       oxyCODONE-acetaminophen (PERCOCET) 5-325 MG tablet Take 1-2 tablets by mouth       rOPINIRole (REQUIP) 0.5 MG tablet        senna-docusate (SENOKOT-S/PERICOLACE) 8.6-50 MG tablet Take 1 tablet by mouth daily       dronabinol (MARINOL) 2.5 MG capsule  (Patient not taking: Reported on 8/12/2022)        Allergies:    Erythromycin    Sulfa drugs    Social History:  Tobacco: Never smoker  Alcohol: Occasional  Employment: Retired. Previously worked as a dental assistant.  Lives in [place], [place]    Family History:    Mother: Lung cancer    Review of Systems   A 10-point review of systems was performed. Pertinent positives include:     Nausea (mild)    Myalgias    Expressive aphasia    Headaches (mild)    Physical Exam   ECOG Status: 1    Vitals:  Weight: 79.7 kg  B/P: 118/76  P: 72  Pain: 8/10    General: 57-year-old female sitting comfortably in a chair in no acute  distress  HEENT: NC/AT. EOMI. No rhinorrhea or epistaxis.  Pulmonary: No wheezing, stridor or respiratory distress  Skin: Normal color and turgor  Neuro: A/O x3. 5/5 motor strength in bilateral upper/lower extremities. Mild expressive aphasia. Normal fine motor control within the bilateral upper extremities. Normal gait.    Imaging/Path/Labs   Imaging: Reviewed    Path: Reviewed    Labs: Reviewed    Assessment    Ms. Pires is a 57 year old female with a recurrent left frontal glioblastoma.    Plan   I had a long discussion with Ms. Pires regarding the use of intracranial reirradiation for local disease control purposes in the treatment of her recurrent glioblastoma. I specifically discussed treatment with a regimen consisting of 35 Gy in 10 once daily fractions to the MRI visible disease and immediate surrounding high risk of brain parenchyma. I reviewed the potential risk/benefits of this approach as well as alternatives to therapy. Ms. Pires had several pertinent questions which were answered to her stated satisfaction and, following this, she was amenable to proceeding with treatment, signing informed consent to this effect.    I will have her return to clinic next week for a CT and MRI guided treatment planning session with radiotherapy to commence approximately 7-10 days thereafter. In the interim, she was provided with my contact information and given instructions to call clinic with any additional questions that may arise following our conversation today.    Merritt Blackman MD/PhD    Dept of Radiation Oncology  [facility]

## 2022-08-13 NOTE — PROGRESS NOTES
Department of Radiation Oncology  [address] [address]  [phone]       Consultation Note    Name: Holli Pires MRN: [id number]   : 1965   Date of Service: 2022  Referring: Dr. Rehana Quinn / neuro-oncology     Reason for consultation: Recurrent left frontal glioblastoma    History of Present Illness   2021: Admitted with word finding difficulties. MRI demonstrates a large left frontotemporal tumor.    2021: Left frontal craniotomy and tumor resection. Pathology reveals a WHO grade 4 glioblastoma, IDH wild-type.    3/8/2021-2021: Receives adjuvant chemoradiotherapy (60 Gy/30 fractions) with concurrent temozolomide at [facility] in [facility]    2021 - 10/2021: Receives 6 cycles of adjuvant temozolomide    2022: MRI demonstrates radiographic evidence concerning for disease progression    2022 - 3/2022: Restarted on temozolomide    3/2022: MRI demonstrates evidence of disease progression    2022: Started on Bevacizumab    2022: MRI with radiographic evidence of a tumor response to therapy    2022: MRI demonstrates disease progression. She is not felt to be a candidate for additional surgical resection.    2022: Evaluated by Dr. Rehana Quinn in neuro-oncology at the [facility]. Discussed discontinuation of Bevacizumab and initiation of Pembrolizumab. Referral placed to radiation oncology for consideration of partial brain reirradiation.    Past Medical History:    Rheumatoid arthritis    Glioblastoma    Past Surgical History:    Right ACL repair    Cholecystectomy    Splenectomy    Bilateral knee replacement    Bilateral hip replacement    Bilateral wrist surgery    Bladder suspension with sling    Left craniotomy and tumor resection    Chemotherapy History:  See HPI    Radiation History:  See HPI    Pregnant: No  Implanted Cardiac Devices: Yes    Medications:  Current  Outpatient Medications   Medication Sig Dispense Refill     acetaminophen (TYLENOL) 325 MG tablet Take 325-650 mg by mouth       buPROPion (WELLBUTRIN SR) 100 MG 12 hr tablet Take 100 mg by mouth       cholecalciferol 50 MCG (2000 UT) tablet Take 2,000 Units by mouth       famotidine (PEPCID) 40 MG tablet Take 20 mg by mouth       famotidine (PEPCID) 40 MG tablet Take 20 mg by mouth       ferrous sulfate (FEROSUL) 325 (65 Fe) MG tablet Take 325 mg by mouth       FLUoxetine (PROZAC) 20 MG capsule        folic acid (FOLVITE) 1 MG tablet Take 1 mg by mouth       levETIRAcetam (KEPPRA) 1000 MG tablet Take 1 tablet by mouth 2 times daily       lisinopril (ZESTRIL) 10 MG tablet TAKE 1 TABLET(10 MG) BY MOUTH EVERY DAY       metoprolol succinate ER (TOPROL XL) 25 MG 24 hr tablet Take 0.5 tablets by mouth daily       Multiple Vitamins-Minerals (SUPER THERA GABINO M) TABS Take 1 tablet by mouth daily       nitroGLYcerin (NITROSTAT) 0.4 MG sublingual tablet Place 0.4 mg under the tongue       ondansetron (ZOFRAN ODT) 4 MG ODT tab Place 4 mg under the tongue       oxyCODONE-acetaminophen (PERCOCET) 5-325 MG tablet Take 1-2 tablets by mouth       rOPINIRole (REQUIP) 0.5 MG tablet        senna-docusate (SENOKOT-S/PERICOLACE) 8.6-50 MG tablet Take 1 tablet by mouth daily       dronabinol (MARINOL) 2.5 MG capsule  (Patient not taking: Reported on 8/12/2022)        Allergies:    Erythromycin    Sulfa drugs    Social History:  Tobacco: Never smoker  Alcohol: Occasional  Employment: Retired. Previously worked as a dental assistant.  Lives in [place], [place]    Family History:    Mother: Lung cancer    Review of Systems   A 10-point review of systems was performed. Pertinent positives include:     Nausea (mild)    Myalgias    Expressive aphasia    Headaches (mild)    Physical Exam   ECOG Status: 1    Vitals:  Weight: 79.7 kg  B/P: 118/76  P: 72  Pain: 8/10    General: 57-year-old female sitting comfortably in a chair in no acute  distress  HEENT: NC/AT. EOMI. No rhinorrhea or epistaxis.  Pulmonary: No wheezing, stridor or respiratory distress  Skin: Normal color and turgor  Neuro: A/O x3. 5/5 motor strength in bilateral upper/lower extremities. Mild expressive aphasia. Normal fine motor control within the bilateral upper extremities. Normal gait.    Imaging/Path/Labs   Imaging: Reviewed    Path: Reviewed    Labs: Reviewed    Assessment    Ms. Pires is a 57 year old female with a recurrent left frontal glioblastoma.    Plan   I had a long discussion with Ms. Pires regarding the use of intracranial reirradiation for local disease control purposes in the treatment of her recurrent glioblastoma. I specifically discussed treatment with a regimen consisting of 35 Gy in 10 once daily fractions to the MRI visible disease and immediate surrounding high risk of brain parenchyma. I reviewed the potential risk/benefits of this approach as well as alternatives to therapy. Ms. Pires had several pertinent questions which were answered to her stated satisfaction and, following this, she was amenable to proceeding with treatment, signing informed consent to this effect.    I will have her return to clinic next week for a CT and MRI guided treatment planning session with radiotherapy to commence approximately 7-10 days thereafter. In the interim, she was provided with my contact information and given instructions to call clinic with any additional questions that may arise following our conversation today.    Merritt Blackman MD/PhD    Dept of Radiation Oncology  [facility]

## 2022-08-30 NOTE — PROGRESS NOTES
[facility]: Cancer Care Plan of Care Education Note                                    Discussion with Patient:                                                      Met with patient after clinic visit/consultation appt with Dr Quinn.  We reviewed Dr. Quinn's plan of care    Household includes:   Benoit     Introduced self and role of RN Care Coordinator at [facility]. Provided my contact information, [facility] phone number (which has options to talk with a Nurse available 24/7 - triage and RNCC via this option during business hours).      Reviewed current adjustments in clinic flow due to Covid-19 pandemic including addition of telemedicine visits, visitor restrictions, RNCCs working remotely at varying intervals, and Covid testing.     Reviewed [facility] care team members including advanced practice providers in oncology dept and usual clinic hours.     Reviewed [facility] Cancer Care Guidebook as resource for additional information including side effects management, clinic information as well as additional supportive care resources. Provided patient [facility] information on Keytruda.            Reviewed appropriate use of MyChart, not to be used for symptom reporting.      Reviewed Auth to Discuss PHI form. Patient completed form.        Learning assessment: Complete     Answered all questions to patient stated satisfaction.        Assessment:                                                      Assessment completed with:: Patient    Current living arrangement  I live in a private home with spouse    Plan of Care Education   Yearly learning assessment completed?: Yes (see Education tab)  Diagnosis:: Glioblastoma  Does patient understand diagnosis?: Yes  Tx plan/regimen:: Keytruda  Does patient understand treatment plan/regimen?: Yes  Preparing for treatment:: Reviewed treatment preparation information with patient (vascular access, day of chemo, visitor policy, what to bring,  etc.)  Side effect education:: Fatigue;Infection;Lab value monitoring (anemia, neutropenia, thrombocytopenia);Skin changes;Nausea/Vomiting  Supportive services:: Cancer rehab;Home care;Home infusion;[facility];Lymphedema therapy;Nutrition;Oncology behavioral health;Palliative care;Social work;Support group  Transportation means:: Family  Safety/self care at home reviewed with patient:: Yes  Informal Support system:: Family;Spouse  Coping - concerns/fears reviewed with patient:: Yes  Plan of Care:: [facility] follow-up appointment;Lab appointment;Imaging;MD follow-up appointment;Treatment schedule  When to call provider:: Bleeding;Increased shortness of breath;New/worsening pain;Shaking chills;Temperature >100.4F;Uncontrolled diarrhea/constipation;Uncontrolled nausea/vomiting  Reasons for deferring treatment reviewed with patient:: Yes    Evaluation of Learning  Patient Education Provided: Yes  Readiness:: Acceptance  Method:: Booklet/Handout;Literature;Explanation  Response:: Verbalizes understanding        Intervention/Education provided during outreach:                                                       Mariella Cao RN, BSN  Oncology/Neurosurgery Care Coordinator  [facility]

## 2022-09-01 NOTE — PROGRESS NOTES
[facility]: Cancer Care                                                                                          Spoke with patient's  today. Patient does not want to pursue any treatment and is requesting a hospice referral. Dr Quinn notified, referral placed.    Mariella Cao, RN, BSN  Oncology/Neurosurgery Care Coordinator  [facility]

## 2022-09-13 NOTE — PROGRESS NOTES
[facility]: Cancer Care                                                                                          Spoke with patient's  to inquire about hospice. He reports patient did sign on to hospice and has experienced significant progression of her disease. She is sleeping most of the time and hasn't eaten since Friday. Patient's  and family feels well supported by hospice and expressed appreciation for the care she has received. Encouraged him to reach out if he needs anything. All appointments have been cancelled.    Mariella Cao, RN, BSN  Oncology/Neurosurgery Care Coordinator  [facility]

## 2023-06-09 ENCOUNTER — PATIENT OUTREACH (OUTPATIENT)
Dept: ONCOLOGY | Facility: CLINIC | Age: 58
End: 2023-06-09
Payer: COMMERCIAL

## 2023-06-12 NOTE — TELEPHONE ENCOUNTER
"Date of death State file number  10/01/2022 [id number]    Verified on [email or web address]    \"Notification of \" form completed [email or web address] emailed to \"[email or web address]\" [email or web address]>  on 2023      "